# Patient Record
Sex: FEMALE | Race: WHITE | URBAN - METROPOLITAN AREA
[De-identification: names, ages, dates, MRNs, and addresses within clinical notes are randomized per-mention and may not be internally consistent; named-entity substitution may affect disease eponyms.]

---

## 2021-01-29 ENCOUNTER — OFFICE VISIT (OUTPATIENT)
Dept: URGENT CARE | Facility: URGENT CARE | Age: 31
End: 2021-01-29
Payer: COMMERCIAL

## 2021-01-29 VITALS
SYSTOLIC BLOOD PRESSURE: 125 MMHG | DIASTOLIC BLOOD PRESSURE: 80 MMHG | RESPIRATION RATE: 16 BRPM | TEMPERATURE: 97.7 F | HEART RATE: 76 BPM | WEIGHT: 96.13 LBS | OXYGEN SATURATION: 98 %

## 2021-01-29 DIAGNOSIS — R30.0 DYSURIA: ICD-10-CM

## 2021-01-29 DIAGNOSIS — B37.31 CANDIDIASIS OF VAGINA: Primary | ICD-10-CM

## 2021-01-29 DIAGNOSIS — N89.8 VAGINAL DISCHARGE: ICD-10-CM

## 2021-01-29 DIAGNOSIS — Z11.3 SCREEN FOR STD (SEXUALLY TRANSMITTED DISEASE): ICD-10-CM

## 2021-01-29 LAB
ALBUMIN UR-MCNC: NEGATIVE MG/DL
APPEARANCE UR: ABNORMAL
BACTERIA #/AREA URNS HPF: ABNORMAL /HPF
BILIRUB UR QL STRIP: ABNORMAL
COLOR UR AUTO: YELLOW
GLUCOSE UR STRIP-MCNC: NEGATIVE MG/DL
HCG UR QL: NEGATIVE
HGB UR QL STRIP: NEGATIVE
KETONES UR STRIP-MCNC: NEGATIVE MG/DL
LEUKOCYTE ESTERASE UR QL STRIP: ABNORMAL
MUCOUS THREADS #/AREA URNS LPF: PRESENT /LPF
NITRATE UR QL: NEGATIVE
NON-SQ EPI CELLS #/AREA URNS LPF: ABNORMAL /LPF
PH UR STRIP: 5.5 PH (ref 5–7)
RBC #/AREA URNS AUTO: ABNORMAL /HPF
SOURCE: ABNORMAL
SP GR UR STRIP: >1.03 (ref 1–1.03)
SPECIMEN SOURCE: ABNORMAL
UROBILINOGEN UR STRIP-ACNC: 0.2 EU/DL (ref 0.2–1)
WBC #/AREA URNS AUTO: ABNORMAL /HPF
WET PREP SPEC: ABNORMAL

## 2021-01-29 PROCEDURE — 87210 SMEAR WET MOUNT SALINE/INK: CPT | Performed by: FAMILY MEDICINE

## 2021-01-29 PROCEDURE — 81001 URINALYSIS AUTO W/SCOPE: CPT | Performed by: FAMILY MEDICINE

## 2021-01-29 PROCEDURE — 87491 CHLMYD TRACH DNA AMP PROBE: CPT | Performed by: FAMILY MEDICINE

## 2021-01-29 PROCEDURE — 81025 URINE PREGNANCY TEST: CPT | Performed by: FAMILY MEDICINE

## 2021-01-29 PROCEDURE — 87591 N.GONORRHOEAE DNA AMP PROB: CPT | Performed by: FAMILY MEDICINE

## 2021-01-29 PROCEDURE — 99203 OFFICE O/P NEW LOW 30 MIN: CPT | Performed by: FAMILY MEDICINE

## 2021-01-29 RX ORDER — DAPSONE 50 MG/G
GEL TOPICAL
COMMUNITY
Start: 2020-09-09

## 2021-01-29 RX ORDER — FLUCONAZOLE 150 MG/1
150 TABLET ORAL DAILY
Qty: 3 TABLET | Refills: 0 | Status: SHIPPED | OUTPATIENT
Start: 2021-01-29 | End: 2021-02-01

## 2021-01-29 RX ORDER — TRETINOIN 0.5 MG/G
CREAM TOPICAL
COMMUNITY
Start: 2020-09-02

## 2021-01-29 RX ORDER — AZITHROMYCIN 500 MG/1
TABLET, FILM COATED ORAL
COMMUNITY
Start: 2020-12-07 | End: 2021-01-29

## 2021-01-29 RX ORDER — FLUCONAZOLE 150 MG/1
150 TABLET ORAL ONCE
Qty: 1 TABLET | Refills: 0 | Status: SHIPPED | OUTPATIENT
Start: 2021-01-29 | End: 2021-01-29

## 2021-01-29 RX ORDER — ALBUTEROL SULFATE 90 UG/1
AEROSOL, METERED RESPIRATORY (INHALATION)
COMMUNITY
Start: 2021-01-18

## 2021-01-29 NOTE — LETTER
January 31, 2021      Anika Rico  [address]  [place] [address]        Dear ,    We are writing to inform you of your test results.    Your test results fall within the expected ranges and negative for Gonorrhea and Chlamydia. Please continue with current treatment plan. Enclosed is a copy of these results.    If you have any questions or concerns, please call the clinic at the number listed above.   Thank you for choosing [facility].      Sincerely,      Arley Joseph MD        Resulted Orders   *UA reflex to Microscopic and Culture ([facility] (except [place] and [place])   Result Value Ref Range    Color Urine Yellow     Appearance Urine Slightly Cloudy     Glucose Urine Negative NEG^Negative mg/dL    Bilirubin Urine Small (A) NEG^Negative      Comment:      This is an unconfirmed screening test result. A positive result may be false.    Ketones Urine Negative NEG^Negative mg/dL    Specific Gravity Urine >1.030 1.003 - 1.035    Blood Urine Negative NEG^Negative    pH Urine 5.5 5.0 - 7.0 pH    Protein Albumin Urine Negative NEG^Negative mg/dL    Urobilinogen Urine 0.2 0.2 - 1.0 EU/dL    Nitrite Urine Negative NEG^Negative    Leukocyte Esterase Urine Trace (A) NEG^Negative    Source Midstream Urine    Wet prep   Result Value Ref Range    Specimen Description Vagina     Wet Prep Yeast seen  Moderate   (A)     Wet Prep No Trichomonas seen     Wet Prep No clue cells seen     Wet Prep WBC'S seen  Few       Wet Prep (Note)  SELF COLLECT.      HCG qualitative urine   Result Value Ref Range    HCG Qual Urine Negative NEG^Negative      Comment:      This test is for screening purposes.  Results should be interpreted along with   the clinical picture.  Confirmation testing is available if warranted by   ordering [id number], HCG Quantitative Pregnancy.     Chlamydia trachomatis PCR   Result Value Ref Range    Specimen Description Vagina     Chlamydia Trachomatis PCR  Negative NEG^Negative      Comment:      Negative for C. trachomatis rRNA by transcription mediated amplification.  A negative result by transcription mediated amplification does not preclude   the presence of C. trachomatis infection because results are dependent on   proper and adequate collection, absence of inhibitors, and sufficient rRNA to   be detected.     Neisseria gonorrhoeae PCR   Result Value Ref Range    Specimen Descrip Vagina     N Gonorrhea PCR Negative NEG^Negative      Comment:      Negative for N. gonorrhoeae rRNA by transcription mediated amplification.  A negative result by transcription mediated amplification does not preclude   the presence of N. gonorrhoeae infection because results are dependent on   proper and adequate collection, absence of inhibitors, and sufficient rRNA to   be detected.     Urine Microscopic   Result Value Ref Range    WBC Urine 0 - 5 OTO5^0 - 5 /HPF    RBC Urine 2-5 (A) OTO2^O - 2 /HPF    Squamous Epithelial /LPF Urine Few FEW^Few /LPF    Bacteria Urine Few (A) NEG^Negative /HPF    Mucous Urine Present (A) NEG^Negative /LPF

## 2021-01-30 NOTE — PROGRESS NOTES
Assessment & Plan     Candidiasis of vagina  Wet prep findings reviewed, consistent with vaginal candidiasis.  Diflucan prescribed, common side effects discussed  - fluconazole (DIFLUCAN) 150 MG tablet; Take 1 tablet (150 mg) by mouth daily for 3 days    Dysuria  Urine findings unremarkable, reassurance provided  - *UA reflex to Microscopic and Culture ([facility] (except [place] and [place])  - HCG qualitative urine  - Urine Microscopic    Vaginal discharge  - Wet prep  - HCG qualitative urine    Screen for STD (sexually transmitted disease)  We will repeat STD screen, safe sexual practice counseling provided  - Chlamydia trachomatis PCR  - Neisseria gonorrhoeae PCR      30 minutes spent on the date of the encounter doing chart review, review of test results, interpretation of tests, patient visit and documentation          Tobacco Cessation:   reports that she has been smoking. She quit smokeless tobacco use about 2 years ago.  Her smokeless tobacco use included chew.  Tobacco Cessation Action Plan: Information offered: Patient not interested at this time      Patient Instructions     Patient Education     Yeast Infection (Candida Vaginal Infection)    You have a Candida vaginal infection. This is also known as a yeast infection. It's most often caused by a type of yeast (fungus) called Candida. Candida are normally found in the vagina. But if they increase in number, this can lead to infection and cause symptoms.   Symptoms of a yeast infection can include:     Clumpy or thin, white discharge, which may look like cottage cheese    Itching or burning    Burning with urination  Certain factors can make a yeast infection more likely. These can include:     Taking certain medicines, such as antibiotics or birth control pills    Pregnancy    Diabetes    Weak immune system  A yeast infection is most often treated with antifungal medicine. This may be given as a vaginal cream or pills you take by  mouth. Treatment may last for about 1 to 7 days. Women with severe or recurrent infections may need longer courses of treatment.   Home care    If you re prescribed medicine, be sure to use it as directed. Finish all of the medicine, even if your symptoms go away. Don t try to treat yourself using over-the-counter products without talking with your provider first. They will let you know if this is a good option for you.    Ask your provider what steps you can take to help reduce your risk of having a yeast infection in the future.    Follow-up care  Follow up with your healthcare provider, or as directed.   When to seek medical advice  Call your healthcare provider right away if:     You have a fever of 100.4 F (38 C) or higher, or as directed by your provider.    Your symptoms worsen, or they don t go away within a few days of starting treatment.    You have new pain in the lower belly or pelvic region.    You have side effects that bother you or a reaction to the cream or pills you re prescribed.    You or any partners you have sex with have new symptoms, such as a rash, joint pain, or sores.  [employer] last reviewed this educational content on 7/1/2020 2000-2020 [employer]. [address] [place] [address]. All rights reserved. This information is not intended as a substitute for professional medical care. Always follow your healthcare professional's instructions.               Arley Joseph MD  [facility]    Chacho Donaldson is a 30 year old who presents to clinic today for the following health issues      HPI       Concern -   Onset: 2 days   Description: Had Chlamydia a few months ago, got a retest 3 weeks ago and it was negative, now has vaginal discharge, odor, and itching since yesterday, would like this checked to make sure she didn't get Chlamydia again, her partner was never retested after the first infection per patient report. Susanne  discharge, some itching   Intensity: moderate  Progression of Symptoms:  worsening  Accompanying Signs & Symptoms: no fever, chills, abdominal pain  Previous history of similar problem: as above   Therapies tried and outcome: None        Review of Systems   Constitutional, HEENT, cardiovascular, pulmonary, gi and gu systems are negative, except as otherwise noted.      Objective    /80 (BP Location: Left arm, Patient Position: Chair, Cuff Size: Adult Small)   Pulse 76   Temp 97.7  F (36.5  C) (Tympanic)   Resp 16   Wt 43.6 kg (96 lb 2 oz)   SpO2 98%   Breastfeeding No   There is no height or weight on file to calculate BMI.  Physical Exam   GENERAL: alert and no distress  NECK: no adenopathy, no asymmetry, masses, or scars and thyroid normal to palpation  RESP: lungs clear to auscultation - no rales, rhonchi or wheezes  CV: regular rate and rhythm, normal S1 S2, no S3 or S4, no murmur, click or rub, no peripheral edema and peripheral pulses strong  ABDOMEN: soft, nontender, no hepatosplenomegaly, no masses and bowel sounds normal  MS: no gross musculoskeletal defects noted, no edema        ----- Ambulatory Services Attestations for Billing on Time -----

## 2021-01-30 NOTE — PATIENT INSTRUCTIONS
Patient Education     Yeast Infection (Candida Vaginal Infection)    You have a Candida vaginal infection. This is also known as a yeast infection. It's most often caused by a type of yeast (fungus) called Candida. Candida are normally found in the vagina. But if they increase in number, this can lead to infection and cause symptoms.   Symptoms of a yeast infection can include:     Clumpy or thin, white discharge, which may look like cottage cheese    Itching or burning    Burning with urination  Certain factors can make a yeast infection more likely. These can include:     Taking certain medicines, such as antibiotics or birth control pills    Pregnancy    Diabetes    Weak immune system  A yeast infection is most often treated with antifungal medicine. This may be given as a vaginal cream or pills you take by mouth. Treatment may last for about 1 to 7 days. Women with severe or recurrent infections may need longer courses of treatment.   Home care    If you re prescribed medicine, be sure to use it as directed. Finish all of the medicine, even if your symptoms go away. Don t try to treat yourself using over-the-counter products without talking with your provider first. They will let you know if this is a good option for you.    Ask your provider what steps you can take to help reduce your risk of having a yeast infection in the future.    Follow-up care  Follow up with your healthcare provider, or as directed.   When to seek medical advice  Call your healthcare provider right away if:     You have a fever of 100.4 F (38 C) or higher, or as directed by your provider.    Your symptoms worsen, or they don t go away within a few days of starting treatment.    You have new pain in the lower belly or pelvic region.    You have side effects that bother you or a reaction to the cream or pills you re prescribed.    You or any partners you have sex with have new symptoms, such as a rash, joint pain, or sores.  [employer]  last reviewed this educational content on 7/1/2020 2000-2020 [employer]. [address] [place] [address], [place] [address]. All rights reserved. This information is not intended as a substitute for professional medical care. Always follow your healthcare professional's instructions.

## 2021-01-31 LAB
C TRACH DNA SPEC QL NAA+PROBE: NEGATIVE
N GONORRHOEA DNA SPEC QL NAA+PROBE: NEGATIVE
SPECIMEN SOURCE: NORMAL
SPECIMEN SOURCE: NORMAL

## 2021-05-04 ENCOUNTER — OFFICE VISIT (OUTPATIENT)
Dept: URGENT CARE | Facility: URGENT CARE | Age: 31
End: 2021-05-04
Payer: COMMERCIAL

## 2021-05-04 VITALS
SYSTOLIC BLOOD PRESSURE: 114 MMHG | RESPIRATION RATE: 20 BRPM | OXYGEN SATURATION: 100 % | HEART RATE: 81 BPM | TEMPERATURE: 98 F | WEIGHT: 101 LBS | DIASTOLIC BLOOD PRESSURE: 74 MMHG

## 2021-05-04 DIAGNOSIS — Z34.91 FIRST TRIMESTER PREGNANCY: ICD-10-CM

## 2021-05-04 DIAGNOSIS — R10.2 PELVIC PAIN IN FEMALE: ICD-10-CM

## 2021-05-04 DIAGNOSIS — N93.9 VAGINAL BLEEDING: Primary | ICD-10-CM

## 2021-05-04 PROCEDURE — 99215 OFFICE O/P EST HI 40 MIN: CPT | Performed by: PHYSICIAN ASSISTANT

## 2021-05-04 RX ORDER — METRONIDAZOLE 500 MG/1
500 TABLET ORAL
COMMUNITY
Start: 2021-04-30 | End: 2021-05-07

## 2021-05-04 NOTE — PROGRESS NOTES
Chief Complaint   Patient presents with     Vaginal Bleeding     Started bleeding over the weekend. Recently confirmed pregnany at [facility]- unsure how far along she is         Medical Decision Making:    Differential Diagnosis:  Ectopic pregnancy, miscarriage, UTI, PID      ASSESSMENT:    ICD-10-CM    1. Vaginal bleeding  N93.9    2. First trimester pregnancy  Z34.91    3. Pelvic pain in female  R10.2          PLAN: Vaginal bleeding in pregnant female with uncertain dates.  To ER for further evaluation and treatment.  No ultrasound available here.  Miscarriage versus ectopic pregnancy.  Ectopic pregnancy can be serious and can cause death.  Patient will go directly to [facility] ER.      Ronna Lewis PA-C        Subjective:   31-year-old female presents for evaluation of vaginal bleeding for about 3 to 4 days.  Started out very light and now today has had to change her panty liner 2 times.  States it is pink in color.  Had a quantitative hCG 4/30/2021 which was 720.  Was told that she should probably have this repeated in 2 days after but never did.  She states her dates are uncertain,?  LMP 3/31/2021.  She states she does not really know how far along she is.  Has had some left-sided pelvic discomfort with this.  No fever or headache.  No dysuria.    Allergies   Allergen Reactions     Bee Venom Anaphylaxis     Penicillins      Strawberry Unknown     Bicillin C-R, Rash       History reviewed. No pertinent past medical history.    albuterol (PROAIR HFA/PROVENTIL HFA/VENTOLIN HFA) 108 (90 Base) MCG/ACT inhaler,   dapsone 5 % topical gel, APPLY TOPICALLY TO AFFECTED AREAS ON THE FACE IN THE MORNING  fluticasone-salmeterol (ADVAIR) 100-50 MCG/DOSE inhaler, INHALE 1 PUFF BY INHALATION ROUTE TWICE DAILY.  metroNIDAZOLE (FLAGYL) 500 MG tablet, Take 500 mg by mouth  tretinoin (RETIN-A) 0.05 % external cream, APPLY TO FACE EVERY DAY IN THE EVENING  DIFLUCAN 150 MG OR TABS, ONE TABLET FOR ONE DOSE NOW, REPEAT ON LAST  DAY OF ANTIBIOTIC PRESCRIPTION (Patient not taking: No sig reported)    No current facility-administered medications on file prior to visit.       Social History     Tobacco Use     Smoking status: Current Every Day Smoker     Smokeless tobacco: Former User     Types: Chew     Quit date: 4/30/2018   Substance Use Topics     Alcohol use: None     Drug use: None       ROS:  General: negative for fever  ABD: As above   : as above    OBJECTIVE:  /74 (BP Location: Left arm, Patient Position: Sitting, Cuff Size: Adult Regular)   Pulse 81   Temp 98  F (36.7  C) (Tympanic)   Resp 20   Wt 45.8 kg (101 lb)   LMP  (LMP Unknown)   SpO2 100%    General:   awake, alert, and cooperative.  NAD.   Head: Normocephalic, atraumatic.  Eyes: Conjunctiva clear, non icteric.   ABD: soft, mild left pelvic tenderness.  No  rigidity, guarding or rebound . No CVAT  Neuro: Alert and oriented - normal speech.                  none